# Patient Record
Sex: MALE | Race: OTHER | HISPANIC OR LATINO | URBAN - METROPOLITAN AREA
[De-identification: names, ages, dates, MRNs, and addresses within clinical notes are randomized per-mention and may not be internally consistent; named-entity substitution may affect disease eponyms.]

---

## 2020-02-05 ENCOUNTER — EMERGENCY (EMERGENCY)
Facility: HOSPITAL | Age: 20
LOS: 1 days | Discharge: HOME | End: 2020-02-05
Attending: EMERGENCY MEDICINE
Payer: COMMERCIAL

## 2020-02-05 VITALS
WEIGHT: 149.91 LBS | OXYGEN SATURATION: 99 % | HEART RATE: 77 BPM | TEMPERATURE: 98 F | RESPIRATION RATE: 18 BRPM | SYSTOLIC BLOOD PRESSURE: 132 MMHG | DIASTOLIC BLOOD PRESSURE: 73 MMHG

## 2020-02-05 PROCEDURE — 99283 EMERGENCY DEPT VISIT LOW MDM: CPT

## 2020-02-05 PROCEDURE — 73130 X-RAY EXAM OF HAND: CPT

## 2020-02-05 PROCEDURE — 73130 X-RAY EXAM OF HAND: CPT | Mod: 26,RT

## 2020-02-05 NOTE — ED PROVIDER NOTE - ATTENDING CONTRIBUTION TO CARE
18 yo M INAD presents to ED c/o R thumb pain s/p hyperextension injury while playing basketball earlier tonight.  On exam R hand with increased STS over 1st MCP with pain on AROM, NVI, good cap refill, x-rays beg. Rx splint, RICE, NSAIDs and hand f/u as outpt

## 2020-02-05 NOTE — ED PROVIDER NOTE - PHYSICAL EXAMINATION
Const: Awake, alert and oriented. In no acute distress. Well appearing.  HEENT: NC/AT. Moist mucous membranes.  Neck:. Soft and supple. Full ROM without pain.  Cardiac: +S1/S2. No murmurs. Peripheral pulses 2+ and symmetric. No LE edema.  Resp: Speaking in full sentences. No evidence of respiratory distress. No wheezes, rales or rhonchi.  Abd: Soft, non-tender, non-distended. Normal bowel sounds in all 4 quadrants. No guarding or rebound.  Back: Spine midline and non-tender. No CVAT.  MSK: Tenderness over right scaphoid, FROM in all extremities, neurovasculary intact, radial pulse 2+, hand  5/5   Skin: No rashes, abrasions or lacerations.  Lymph: No cervical lymphadenopathy.  Neuro: Awake, alert & oriented x 3. Moves all extremities symmetrically. Const: Awake, alert and oriented. In no acute distress. Well appearing.  HEENT: NC/AT. Moist mucous membranes.  Neck:. Soft and supple. Full ROM without pain.  Cardiac: +S1/S2. No murmurs. Peripheral pulses 2+ and symmetric. No LE edema.  Resp: Speaking in full sentences. No evidence of respiratory distress. No wheezes, rales or rhonchi.  Abd: Soft, non-tender, non-distended. Normal bowel sounds in all 4 quadrants. No guarding or rebound.  Back: Spine midline and non-tender. No CVAT.  MSK: Tenderness over right 1st digit no scaphoid tenderness, FROM in all extremities, neurovasculary intact, radial pulse 2+, hand  5/5   Skin: No rashes, abrasions or lacerations.  Lymph: No cervical lymphadenopathy.  Neuro: Awake, alert & oriented x 3. Moves all extremities symmetrically.

## 2020-02-05 NOTE — ED PROVIDER NOTE - CLINICAL SUMMARY MEDICAL DECISION MAKING FREE TEXT BOX
Xray of hand - will place in thumb spica splint, pt to follow up with orthopedics, pt explained d/c instructions Xray of hand - t, pt to follow up with orthopedics, pt explained d/c instructions

## 2020-02-05 NOTE — ED PROVIDER NOTE - PATIENT PORTAL LINK FT
You can access the FollowMyHealth Patient Portal offered by [facility] by registering at the following website: [email or web address]. By joining [employer]’s FollowMyHealth portal, you will also be able to view your health information using other applications (apps) compatible with our system.

## 2020-02-05 NOTE — ED PROVIDER NOTE - OBJECTIVE STATEMENT
Patient is a 20 y/o male presenting to the ed with right 1st finger pain. pt states he was playing basketball and outstretched right hand injuring it. pt denies head injury or loc. pt denies numbness or loss of sensation, abrasions or lacerations, back pain, nausea, vomiting, cp

## 2020-02-05 NOTE — ED ADULT TRIAGE NOTE - CHIEF COMPLAINT QUOTE
Patient alert and oriented x4, patient states he fell onto his right hand. swelling and pain to right thumb noted per patient. denies numbness or tingling to RUE.  no obvious deformity noted. denies headstrike with fall.

## 2021-10-07 NOTE — ED PROVIDER NOTE - CROS ED CARDIOVAS ALL NEG
From: [place]  To: Navi Smith  Sent: 10/7/2021 10:19 AM CDT  Subject: My back & a trip to [place]    My back is the same. You may recall that I saw Dr Melendez earlier in the year. His PA gave me a prescription for a pack that contained pills to take for six days. Not too much relief from that. I discussed with him, as you and I have discussed, having shots at a pain clinic prior to a trip / vacation. I will be going to [place] for two weeks this November 21st. How do I proceed to arrange for the shot(s)? Thanks for the handicap sticker. I use it rarely but use has been necessary. Before use I do check around to see who is looking. I don't know what's worse the pain in my back or the pain of pride - lack of humility!   negative...

## 2024-02-27 ENCOUNTER — EMERGENCY (EMERGENCY)
Facility: HOSPITAL | Age: 24
LOS: 1 days | Discharge: HOME | End: 2024-02-27
Attending: STUDENT IN AN ORGANIZED HEALTH CARE EDUCATION/TRAINING PROGRAM
Payer: COMMERCIAL

## 2024-02-27 VITALS
RESPIRATION RATE: 20 BRPM | WEIGHT: 156.75 LBS | TEMPERATURE: 98 F | DIASTOLIC BLOOD PRESSURE: 95 MMHG | OXYGEN SATURATION: 99 % | SYSTOLIC BLOOD PRESSURE: 144 MMHG | HEART RATE: 63 BPM

## 2024-02-27 PROCEDURE — 99283 EMERGENCY DEPT VISIT LOW MDM: CPT

## 2024-02-27 RX ORDER — ACETAMINOPHEN 500 MG
975 TABLET ORAL ONCE
Refills: 0 | Status: COMPLETED | OUTPATIENT
Start: 2024-02-27 | End: 2024-02-27

## 2024-02-27 RX ORDER — OXYCODONE HYDROCHLORIDE 5 MG/1
1 TABLET ORAL
Qty: 12 | Refills: 0
Start: 2024-02-27 | End: 2024-02-29

## 2024-02-27 RX ORDER — IBUPROFEN 200 MG
600 TABLET ORAL ONCE
Refills: 0 | Status: COMPLETED | OUTPATIENT
Start: 2024-02-27 | End: 2024-02-27

## 2024-02-27 RX ADMIN — Medication 1 TABLET(S): at 19:04

## 2024-02-27 RX ADMIN — Medication 975 MILLIGRAM(S): at 19:04

## 2024-02-27 RX ADMIN — Medication 600 MILLIGRAM(S): at 19:04

## 2024-02-27 NOTE — ED PROVIDER NOTE - PHYSICAL EXAMINATION
Gen: NAD, non-toxic, conversational  Eyes: PERRLA, EOMI   HENT: Normocephalic, atraumatic but with swelling; right upper premolar with fracture / decay, local swelling, no white dot / spot on buccal surface to indicate abscess. Swelling along the right maxillary region without any swelling of the periorbital region. There is a small right upper lid stye that is mildly inflamed. External ears normal, no rhinorrhea, moist mucous membranes.   CV: RRR, no M/R/G  Resp: CTAB, non-labored, speaking without difficulty on room air  Abd: soft, non tender, non rigid, no guarding or rebound tenderness  Back: No CVAT bilaterally, no midline ttp  Skin: dry, wwp   Neuro: AOx3, speech is fluent and appropriate  Psych: Mood ok, affect euthymic

## 2024-02-27 NOTE — ED PROVIDER NOTE - NSFOLLOWUPINSTRUCTIONS_ED_ALL_ED_FT
Take acetaminophen (Tylenol) 975mg (3 regular strength tablets or 2 extra strength tablets) as often as every 6 hours as needed for pain. Never take more than 4000mg of acetaminophen/Tylenol in any 24 hour period. Be cautious of over the counter medications as many formularies contain acetaminophen in them.     Take the oxycodone medication as frequently as once every 6 hours as needed for pain that is uncontrolled by your other pain medications. Do not drink alcohol while using this medication. Do not take this medication with benzodiazepine medications or other opioids. Do not drive or do any potentially harmful tasks while using this medication or for 6-8 hours following.     Take ibuprofen (or Motrin) 600mg (3 tablets) up to 4 times per day as needed for pain with food or milk.     Stye    Apply warm compresses to your right eye 4-5 times daily. Get a rag and soak it with hot water, not too hot that you would burn yourself, but warm enough that it feels like it is heating up the area relative to your body temp. Follow up with the ophthalmologist if you do not have significant improvement in symptoms over the next few days.     Dental Pain    Dental pain (toothache) may be caused by many things including tooth decay (cavities or caries), abscess or infection, or trauma. If you were prescribed an antibiotic medicine, finish all of it even if you start to feel better. Rinsing your mouth with salt water or applying ice to the painful area of your face may help with the pain. Follow up with a dentist is important in ensuring good oral health and preventing the worsening of dental disease.    SEEK IMMEDIATE MEDICAL CARE IF YOU HAVE ANY OF THE FOLLOWING SYMPTOMS: unable to open your mouth, trouble breathing or swallowing, fever, or swelling of the face, neck, or jaw.

## 2024-02-27 NOTE — ED PROVIDER NOTE - OBJECTIVE STATEMENT
23-year-old male presenting for evaluation of right upper mouth pain due to a dental fracture as well as facial swelling along the right maxilla.  States that his symptoms have worsened over the past 12 hours.  Could feel the tooth starting to bother him last night.  No difficulties breathing, no difficulty swallowing, called his dentist to schedule an appointment can be seen in a couple of days.  Denies other symptoms of acute concern at this time.  Rates his pain as a 9 out of 10.

## 2024-02-27 NOTE — ED ADULT TRIAGE NOTE - CHIEF COMPLAINT QUOTE
C/O right sided facial swelling for one day with pain. PT states he has a broken tooth and thinks he has an abscess now. denies fevers.

## 2024-02-27 NOTE — ED PROVIDER NOTE - CLINICAL SUMMARY MEDICAL DECISION MAKING FREE TEXT BOX
23-year-old male presenting with right upper dental infection of premolar which appears fractured on exam as well as stye on his right eyelid.  Notes he is here for the dental infection though did recommend warm soaks/compresses for the right eyelid.  Will treat with medications conservatively here, stronger pain regimen sent to his pharmacy given he is driving.  Will discharge with outpatient follow-up given he is already established an appointment with dental.  Return precautions for any significant worsening or changes

## 2024-02-27 NOTE — ED ADULT TRIAGE NOTE - TEMPERATURE IN CELSIUS (DEGREES C)
36.7 CM in to see Pt to follow up on discharge planning. Plan remains home with his wife. Pt denies any needs at this time.      CM following

## 2024-02-27 NOTE — ED PROVIDER NOTE - PATIENT PORTAL LINK FT
You can access the FollowMyHealth Patient Portal offered by [facility] by registering at the following website: [email or web address]. By joining [employer]’s FollowMyHealth portal, you will also be able to view your health information using other applications (apps) compatible with our system.

## 2024-03-06 ENCOUNTER — OFFICE (OUTPATIENT)
Dept: URBAN - METROPOLITAN AREA CLINIC 12 | Facility: CLINIC | Age: 24
Setting detail: OPHTHALMOLOGY
End: 2024-03-06
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
## 2024-03-06 PROCEDURE — NO SHOW FE NO SHOW FEE: Performed by: STUDENT IN AN ORGANIZED HEALTH CARE EDUCATION/TRAINING PROGRAM

## 2024-03-07 ENCOUNTER — OFFICE (OUTPATIENT)
Dept: URBAN - METROPOLITAN AREA CLINIC 12 | Facility: CLINIC | Age: 24
Setting detail: OPHTHALMOLOGY
End: 2024-03-07
Payer: COMMERCIAL

## 2024-03-07 DIAGNOSIS — H00.11: ICD-10-CM

## 2024-03-07 PROCEDURE — 99203 OFFICE O/P NEW LOW 30 MIN: CPT | Performed by: STUDENT IN AN ORGANIZED HEALTH CARE EDUCATION/TRAINING PROGRAM

## 2024-03-07 ASSESSMENT — LID EXAM ASSESSMENTS
OD_COMMENTS: COLLARETTES
OS_COMMENTS: COLLARETTES

## 2024-03-18 ENCOUNTER — EMERGENCY (EMERGENCY)
Facility: HOSPITAL | Age: 24
LOS: 1 days | Discharge: HOME | End: 2024-03-18
Attending: STUDENT IN AN ORGANIZED HEALTH CARE EDUCATION/TRAINING PROGRAM
Payer: COMMERCIAL

## 2024-03-18 VITALS
OXYGEN SATURATION: 99 % | DIASTOLIC BLOOD PRESSURE: 75 MMHG | SYSTOLIC BLOOD PRESSURE: 133 MMHG | WEIGHT: 149.91 LBS | TEMPERATURE: 98 F | HEIGHT: 73 IN | RESPIRATION RATE: 18 BRPM | HEART RATE: 72 BPM

## 2024-03-18 PROCEDURE — 99283 EMERGENCY DEPT VISIT LOW MDM: CPT

## 2024-03-18 PROCEDURE — 99284 EMERGENCY DEPT VISIT MOD MDM: CPT

## 2024-03-18 RX ORDER — OXYCODONE AND ACETAMINOPHEN 5; 325 MG/1; MG/1
1 TABLET ORAL
Qty: 8 | Refills: 0
Start: 2024-03-18 | End: 2024-03-19

## 2024-03-18 RX ORDER — IBUPROFEN 200 MG
1 TABLET ORAL
Qty: 28 | Refills: 0
Start: 2024-03-18 | End: 2024-03-24

## 2024-03-18 RX ADMIN — Medication 1 TABLET(S): at 21:56

## 2024-03-18 NOTE — ED PROVIDER NOTE - NSFOLLOWUPINSTRUCTIONS_ED_ALL_ED_FT
For the Patient  Dealing with tooth pain  Do you have tooth pain? Whether it’s a short-lived,  sharp, shooting pain or a prolonged, mild ache, you  should see your dentist. (NOTE: There are other  sources of oral pain that are not discussed here, like oral sores,  jaw pain, and headaches that your dentist also may be able to  help with.)  TYPES OF PAIN  Sharp pain  You may feel a shooting pain when you eat or drink something hot or cold or sweet or sour. Pressure, like  from toothbrushing or biting, also might spark this kind of  pain.  Some things that may cause this short-lived pain reaction  include1  n a cavity;  n a cracked tooth;  n an exposed tooth root.  Any of these can leave the inner portion of your tooth,  called the pulp, unprotected. The pulp is your tooth’s nerve  and blood supply. In a healthy, undamaged tooth, the pulp is  protected by 3 outer layers: enamel, cementum, and dentin.  Enamel is the part of the tooth that you see, and it connects to  the dentin. Cementum also connects to the dentin, but it  covers the tooth root (Figure).  Things that damage the enamel, like a cavity, chip, or  crack, may cause tooth pain.  Anything that exposes the cementum also might set you up  for pain. Cementum is softer than enamel. When it is left  unprotected by the gums, it can be worn away easily.  Damage to the enamel or cementum may leave the dentin  exposed. The dentin directly connects to the pulp through  tiny tubes or canals. Researchers do not know why, but exposure  of the dentin may leave the pulp sensitive to things like changes  in temperature, certain foods and beverages, or pressure.  Dull, throbbing pain  Sometimes dental pain involves an area in or around the  mouth and jaw with a steady ache that goes on for days. This  type of pain may indicate an infection.  TREATMENT  Treatment depends on the cause of the pain you are having. Sharp pain might be caused by enamel damage like  cavities, chips, or cracks, which might call for repair. You  may need a new dental filling or crown. When the  cementum is damageddexposing the dentindtopical varnish, which goes on as a liquid and then hardens to protect  the exposed tooth roots, might be applied.  Treatment for throbbing pains also depends on what is  causing the problem. If an infection is involved, your dentist  will work to identify the source. Once that has been narrowed  down, he or she can look at options like removing the infected  tissue (for example, with a root canal).  CONCLUSION  Tooth pain can be experienced in different ways. Taking care  of the problem requires finding the reason for the pain. n

## 2024-03-18 NOTE — ED PROVIDER NOTE - ATTENDING APP SHARED VISIT CONTRIBUTION OF CARE
23 year old male present to ED for right sided facial pain, tracks along his premolars, has had pain from dental caries in the past, no obvious abscess, stable for outpt follow up with dental on antibiotics

## 2024-03-18 NOTE — ED PROVIDER NOTE - PATIENT PORTAL LINK FT
You can access the FollowMyHealth Patient Portal offered by [facility] by registering at the following website: [email or web address]. By joining [employer]’s FollowMyHealth portal, you will also be able to view your health information using other applications (apps) compatible with our system.

## 2024-03-18 NOTE — ED PROVIDER NOTE - NSFOLLOWUPCLINICS_GEN_ALL_ED_FT
[facility]  [place] [address]  Phone: [phone]  Fax:

## 2024-03-18 NOTE — ED PROVIDER NOTE - PHYSICAL EXAMINATION
Gen: No acute distress, non toxic  HEENT: +swelling, ttp right maxilla no fluctuance. superior posterior molar with dental fracture, no fluctuance, drainage.  Mucous membranes moist, pink conjunctivae, EOMI  CV: RRR, nl s1/s2.  Resp: CTAB, normal rate and effort  GI: Abdomen soft, NT, ND. No rebound, no guarding  Neuro: A&O x 3, moving all 4 extremities  MSK: No deformities.   Skin: No rashes. intact and perfused.

## 2024-03-18 NOTE — ED PROVIDER NOTE - CLINICAL SUMMARY MEDICAL DECISION MAKING FREE TEXT BOX
23 year old male present to ED for right sided facial pain. Pt reports right posterior maxillary dental fracture s/p root canal 1 year ago. pt reports similar pain one month prior that resolved with abx. Pt states 2 days of pain to right superior molar, 6 hours of swelling to right maxilla. Pain 9/10. Pt reports he has dental follow up tomorrow 3/19. Pt denies fever, chills, discharge, drainage, difficulty swallowing, lightheadedness, SOB, CP, abd pain.   Pt with dental infection, pain. No systemic signs of infection, no sign of abscess  Pain control, abx, out paitent follow up

## 2024-03-18 NOTE — ED ADULT NURSE NOTE - OBJECTIVE STATEMENT
A&OX4, patient is complaining of right side tooth ache, right facial swelling noted, denies sob, denies chest pain, denies numbness and tingling.

## 2024-03-18 NOTE — ED PROVIDER NOTE - OBJECTIVE STATEMENT
23 year old male present to ED for right sided facial pain. Pt reports right posterior maxillary dental fracture s/p root canal 1 year ago. pt reports similar pain one month prior that resolved with abx. Pt states 2 days of pain to right superior molar, 6 hours of swelling to right maxilla. Pain 9/10. Pt reports he has dental follow up tomorrow 3/19. Pt denies fever, chills, discharge, drainage, difficulty swallowing, lightheadedness, SOB, CP, abd pain.

## 2024-03-18 NOTE — ED PROVIDER NOTE - IV ALTEPLASE DOOR HIDDEN
Department of Dermatology    Liquid Nitrogen Treatment    · Liquid nitrogen is a cold, liquifed gas with a temperature of 196 degrees below zero Celsius (-321 degrees Farerhheit).  It is used to freeze and destroy superficial skin growths such as warts and keratoses.    · No special care is needed immediately after liquid nitrogen treatment.  Just ignore it.  You can wash your skin as usual.  If clothing irritates the area, cover it with a band-aid.    · Liquid nitrogen causes stinging and some pain while the growth is being frozen and the thawed.  This discomfort usually lasts less than one hour.    · After a liquid nitrogen treatment, your skin will become swollen and red.  Later, it may blister, and a scab (crust) will form.  The scab will fall off by itself, leaving healthy, new skin.    Growth treated on the face heal fairly quickly, usually about two weeks.  The chest, back, and arms take longer to heal, often 4 to 5 weeks.  The lower legs may take 6 or more weeks to heal.    If your growth required deep freezing, there may be considerable blistering and swelling, especially if your hands, scalp, or eyelids were treated.  Sometimes, a blood blister may appear.  The blisters and swelling are part of the treatment and will gradually heal by themselves.  If a blister is annoyingly large, you may puncture it with a sterile needle and absorb the fluid with a tissue.  Afterward, cover it with a band-aid for 4-10 hours.  Keep it moist by using a sterile Vaseline packet (if one was given to you).    Sometimes, liquid nitrogen treatment fails.  If the growth is not removed by liquid nitrogen treatment, please make a return appointment.    [facility] - DERMATOLOGY / [address]  [address] [place] [address]  Dept: [phone]         Additional Educational Resources:  For additional resources regarding your symptoms, diagnosis, or further health information, please visit the Health Resources section on  [email or web address] or the Online Health Resources section in [employer].                     show

## 2024-03-19 PROBLEM — Z78.9 OTHER SPECIFIED HEALTH STATUS: Chronic | Status: ACTIVE | Noted: 2024-02-27

## 2024-03-21 ENCOUNTER — RX ONLY (RX ONLY)
Age: 24
End: 2024-03-21

## 2024-03-21 ENCOUNTER — OFFICE (OUTPATIENT)
Dept: URBAN - METROPOLITAN AREA CLINIC 100 | Facility: CLINIC | Age: 24
Setting detail: OPHTHALMOLOGY
End: 2024-03-21
Payer: COMMERCIAL

## 2024-03-21 DIAGNOSIS — H00.11: ICD-10-CM

## 2024-03-21 PROBLEM — H01.004 BLEPHARITIS; RIGHT UPPER LID, LEFT UPPER LID: Status: ACTIVE | Noted: 2024-03-21

## 2024-03-21 PROBLEM — H01.001 BLEPHARITIS; RIGHT UPPER LID, LEFT UPPER LID: Status: ACTIVE | Noted: 2024-03-21

## 2024-03-21 PROCEDURE — 67800 REMOVE EYELID LESION: CPT | Mod: E3 | Performed by: OPHTHALMOLOGY

## 2024-03-21 PROCEDURE — 92285 EXTERNAL OCULAR PHOTOGRAPHY: CPT | Performed by: OPHTHALMOLOGY

## 2024-03-21 ASSESSMENT — LID EXAM ASSESSMENTS
OD_BLEPHARITIS: RUL
OS_BLEPHARITIS: LUL

## 2024-04-04 ENCOUNTER — OFFICE (OUTPATIENT)
Dept: URBAN - METROPOLITAN AREA CLINIC 100 | Facility: CLINIC | Age: 24
Setting detail: OPHTHALMOLOGY
End: 2024-04-04
Payer: COMMERCIAL

## 2024-04-04 DIAGNOSIS — H01.004: ICD-10-CM

## 2024-04-04 DIAGNOSIS — H00.11: ICD-10-CM

## 2024-04-04 DIAGNOSIS — H01.001: ICD-10-CM

## 2024-04-04 PROCEDURE — 92012 INTRM OPH EXAM EST PATIENT: CPT | Performed by: OPHTHALMOLOGY

## 2024-04-04 ASSESSMENT — LID EXAM ASSESSMENTS
OS_BLEPHARITIS: LUL
OD_BLEPHARITIS: RUL

## 2024-04-14 ENCOUNTER — EMERGENCY (EMERGENCY)
Facility: HOSPITAL | Age: 24
LOS: 1 days | Discharge: HOME | End: 2024-04-14
Attending: STUDENT IN AN ORGANIZED HEALTH CARE EDUCATION/TRAINING PROGRAM
Payer: COMMERCIAL

## 2024-04-14 VITALS
HEIGHT: 73 IN | DIASTOLIC BLOOD PRESSURE: 71 MMHG | HEART RATE: 86 BPM | SYSTOLIC BLOOD PRESSURE: 128 MMHG | WEIGHT: 149.91 LBS | TEMPERATURE: 98 F | OXYGEN SATURATION: 99 % | RESPIRATION RATE: 18 BRPM

## 2024-04-14 LAB
ALBUMIN SERPL ELPH-MCNC: 4.5 G/DL — SIGNIFICANT CHANGE UP (ref 3.3–5.2)
ALP SERPL-CCNC: 114 U/L — SIGNIFICANT CHANGE UP (ref 40–120)
ALT FLD-CCNC: 41 U/L — HIGH
ANION GAP SERPL CALC-SCNC: 12 MMOL/L — SIGNIFICANT CHANGE UP (ref 5–17)
AST SERPL-CCNC: 30 U/L — SIGNIFICANT CHANGE UP
BASOPHILS # BLD AUTO: 0.01 K/UL — SIGNIFICANT CHANGE UP (ref 0–0.2)
BASOPHILS NFR BLD AUTO: 0.1 % — SIGNIFICANT CHANGE UP (ref 0–2)
BILIRUB SERPL-MCNC: 0.3 MG/DL — LOW (ref 0.4–2)
BUN SERPL-MCNC: 9.3 MG/DL — SIGNIFICANT CHANGE UP (ref 8–20)
CALCIUM SERPL-MCNC: 9.4 MG/DL — SIGNIFICANT CHANGE UP (ref 8.4–10.5)
CHLORIDE SERPL-SCNC: 100 MMOL/L — SIGNIFICANT CHANGE UP (ref 96–108)
CK MB CFR SERPL CALC: <1 NG/ML — SIGNIFICANT CHANGE UP (ref 0–6.7)
CK SERPL-CCNC: 191 U/L — SIGNIFICANT CHANGE UP (ref 30–200)
CO2 SERPL-SCNC: 28 MMOL/L — SIGNIFICANT CHANGE UP (ref 22–29)
CREAT SERPL-MCNC: 0.75 MG/DL — SIGNIFICANT CHANGE UP (ref 0.5–1.3)
D DIMER BLD IA.RAPID-MCNC: <150 NG/ML DDU — SIGNIFICANT CHANGE UP
EGFR: 130 ML/MIN/1.73M2 — SIGNIFICANT CHANGE UP
EOSINOPHIL # BLD AUTO: 0.05 K/UL — SIGNIFICANT CHANGE UP (ref 0–0.5)
EOSINOPHIL NFR BLD AUTO: 0.5 % — SIGNIFICANT CHANGE UP (ref 0–6)
GLUCOSE SERPL-MCNC: 99 MG/DL — SIGNIFICANT CHANGE UP (ref 70–99)
HCT VFR BLD CALC: 44.6 % — SIGNIFICANT CHANGE UP (ref 39–50)
HGB BLD-MCNC: 15.2 G/DL — SIGNIFICANT CHANGE UP (ref 13–17)
IMM GRANULOCYTES NFR BLD AUTO: 0.6 % — SIGNIFICANT CHANGE UP (ref 0–0.9)
LYMPHOCYTES # BLD AUTO: 1.49 K/UL — SIGNIFICANT CHANGE UP (ref 1–3.3)
LYMPHOCYTES # BLD AUTO: 14 % — SIGNIFICANT CHANGE UP (ref 13–44)
MAGNESIUM SERPL-MCNC: 1.9 MG/DL — SIGNIFICANT CHANGE UP (ref 1.6–2.6)
MCHC RBC-ENTMCNC: 29.3 PG — SIGNIFICANT CHANGE UP (ref 27–34)
MCHC RBC-ENTMCNC: 34.1 GM/DL — SIGNIFICANT CHANGE UP (ref 32–36)
MCV RBC AUTO: 86.1 FL — SIGNIFICANT CHANGE UP (ref 80–100)
MONOCYTES # BLD AUTO: 1.03 K/UL — HIGH (ref 0–0.9)
MONOCYTES NFR BLD AUTO: 9.7 % — SIGNIFICANT CHANGE UP (ref 2–14)
NEUTROPHILS # BLD AUTO: 8.02 K/UL — HIGH (ref 1.8–7.4)
NEUTROPHILS NFR BLD AUTO: 75.1 % — SIGNIFICANT CHANGE UP (ref 43–77)
NT-PROBNP SERPL-SCNC: <36 PG/ML — SIGNIFICANT CHANGE UP (ref 0–300)
PLATELET # BLD AUTO: 235 K/UL — SIGNIFICANT CHANGE UP (ref 150–400)
POTASSIUM SERPL-MCNC: 3.9 MMOL/L — SIGNIFICANT CHANGE UP (ref 3.5–5.3)
POTASSIUM SERPL-SCNC: 3.9 MMOL/L — SIGNIFICANT CHANGE UP (ref 3.5–5.3)
PROT SERPL-MCNC: 8.2 G/DL — SIGNIFICANT CHANGE UP (ref 6.6–8.7)
RBC # BLD: 5.18 M/UL — SIGNIFICANT CHANGE UP (ref 4.2–5.8)
RBC # FLD: 11.9 % — SIGNIFICANT CHANGE UP (ref 10.3–14.5)
SODIUM SERPL-SCNC: 140 MMOL/L — SIGNIFICANT CHANGE UP (ref 135–145)
TROPONIN T, HIGH SENSITIVITY RESULT: <6 NG/L — SIGNIFICANT CHANGE UP (ref 0–51)
WBC # BLD: 10.66 K/UL — HIGH (ref 3.8–10.5)
WBC # FLD AUTO: 10.66 K/UL — HIGH (ref 3.8–10.5)

## 2024-04-14 PROCEDURE — 36415 COLL VENOUS BLD VENIPUNCTURE: CPT

## 2024-04-14 PROCEDURE — 71046 X-RAY EXAM CHEST 2 VIEWS: CPT | Mod: 26

## 2024-04-14 PROCEDURE — 71046 X-RAY EXAM CHEST 2 VIEWS: CPT

## 2024-04-14 PROCEDURE — 85379 FIBRIN DEGRADATION QUANT: CPT

## 2024-04-14 PROCEDURE — 93005 ELECTROCARDIOGRAM TRACING: CPT

## 2024-04-14 PROCEDURE — 99285 EMERGENCY DEPT VISIT HI MDM: CPT | Mod: 25

## 2024-04-14 PROCEDURE — 93010 ELECTROCARDIOGRAM REPORT: CPT

## 2024-04-14 PROCEDURE — 83735 ASSAY OF MAGNESIUM: CPT

## 2024-04-14 PROCEDURE — 83880 ASSAY OF NATRIURETIC PEPTIDE: CPT

## 2024-04-14 PROCEDURE — 99285 EMERGENCY DEPT VISIT HI MDM: CPT

## 2024-04-14 PROCEDURE — 84484 ASSAY OF TROPONIN QUANT: CPT

## 2024-04-14 PROCEDURE — 85025 COMPLETE CBC W/AUTO DIFF WBC: CPT

## 2024-04-14 PROCEDURE — 80053 COMPREHEN METABOLIC PANEL: CPT

## 2024-04-14 PROCEDURE — 82550 ASSAY OF CK (CPK): CPT

## 2024-04-14 PROCEDURE — 82553 CREATINE MB FRACTION: CPT

## 2024-04-14 RX ORDER — CEPHALEXIN 500 MG
1 CAPSULE ORAL
Qty: 40 | Refills: 0
Start: 2024-04-14 | End: 2024-04-23

## 2024-04-14 RX ORDER — IBUPROFEN 200 MG
400 TABLET ORAL ONCE
Refills: 0 | Status: COMPLETED | OUTPATIENT
Start: 2024-04-14 | End: 2024-04-14

## 2024-04-14 RX ADMIN — Medication 400 MILLIGRAM(S): at 11:50

## 2024-04-14 NOTE — ED PROVIDER NOTE - NS ED ATTENDING STATEMENT MOD
Attending Only This was a shared visit with the FLORIDALMA. I reviewed and verified the documentation.

## 2024-04-14 NOTE — ED PROVIDER NOTE - OBJECTIVE STATEMENT
23-year-old male with no past medical history presents with sharp pain in his axilla intermittently x 1 week worse over the last couple days, and this morning since 7:30 AM woke up with sharp left-sided pleuritic chest discomfort when he went to  his daughter and noticed some wheezing.  No other trauma no heavy lifting no shortness of breath.  No family history of sudden cardiac death or early cardiac disease.  Travel to [place] 3 months ago.

## 2024-04-14 NOTE — ED PROVIDER NOTE - PHYSICAL EXAMINATION
PHYSICAL EXAM:   General: well-appearing, appears stated age, not in extremis   HEENT: NC/AT, airway patent  Cardiovascular: regular rate and rhythm, + S1/S2, no murmurs, rubs, gallops appreciated  Respiratory: clear to auscultation bilaterally, good aeration bilaterally, nonlabored respirations  Abdominal: soft, nontender, nondistended, no rebound, guarding or rigidity  Back: no costovertebral tenderness,   Extremities: no LE edema or calf tenderness b/l. Radial pulses equal and strong b/l  Neuro: Alert and oriented x3. Moving all extremities.   Psychiatric: appropriate mood and affect.   Skin: b/l tender axillary lymph nodes noted, R submandibular lymph node noted, small erytehmatous tender nodule at 1 oclock position on L breast, no fluctuance palpated  -Susan Ren MD Attending Physician

## 2024-04-14 NOTE — ED PROVIDER NOTE - PATIENT PORTAL LINK FT
You can access the FollowMyHealth Patient Portal offered by [facility] by registering at the following website: [email or web address]. By joining [employer]’s FollowMyHealth portal, you will also be able to view your health information using other applications (apps) compatible with our system.

## 2024-04-14 NOTE — ED PROVIDER NOTE - CLINICAL SUMMARY MEDICAL DECISION MAKING FREE TEXT BOX
History and physical as noted.  Pleuritic chest discomfort, travel to [place] 3 months ago, but no other risk factors for DVT PE.  No shortness of breath, no tachycardia, no family history of early heart disease.  Will get screening labs EKG and D-dimer.  EKG with normal sinus rhythm normal axis normal intervals no acute diagnostic ischemic changes, likely benign early repoll V2 to V3.  Chest pain not improved with leaning forward.  EKG not consistent with pericarditis.  On exam patient with tender axillary nodules and chest nodule. POCUS of area to 1 oclock position on L breast near nipple with some edema/cobblestonning noted. no drainable collection or fluid.  No fevers no recent illness.  Possibly enlarged lymph nodes in the axilla, will give Motrin for symptomatic control.  If workup today is not remarkable will give antibiotics for tender nodule on breast for possible early cellulitis/early phlegmon.  Return precautions discussed with patient for worsening pain swelling fever despite being on antibiotics.

## 2024-04-14 NOTE — ED ADULT NURSE NOTE - OBJECTIVE STATEMENT
Pt is here with c/o Chest pain for the past two days.  Also c/o swelling in his underarms.  Pt denies cough, denies fever or SOB.  #20G IV inserted to [facility], labs sent.  Pt medicated for pain as ordered.

## 2024-04-14 NOTE — ED PROVIDER NOTE - ATTENDING APP SHARED VISIT CONTRIBUTION OF CARE
I, Susan Ren, performed the initial face to face bedside interview with this patient regarding history of present illness, review of symptoms and relevant past medical, social and/or family history.  I completed an independent physical examination and MDM.  I was the initial provider who evaluated this patient. I have signed out the follow up and disposition of any pending tests (i.e. labs, radiological studies) to the ACP.  I have communicated the patient’s plan of care and disposition with the ACP.

## 2025-03-10 ENCOUNTER — OFFICE (OUTPATIENT)
Dept: URBAN - METROPOLITAN AREA CLINIC 100 | Facility: CLINIC | Age: 25
Setting detail: OPHTHALMOLOGY
End: 2025-03-10
Payer: COMMERCIAL

## 2025-03-10 DIAGNOSIS — H01.001: ICD-10-CM

## 2025-03-10 DIAGNOSIS — H01.004: ICD-10-CM

## 2025-03-10 DIAGNOSIS — H00.11: ICD-10-CM

## 2025-03-10 PROCEDURE — 92285 EXTERNAL OCULAR PHOTOGRAPHY: CPT | Performed by: OPHTHALMOLOGY

## 2025-03-10 PROCEDURE — 92012 INTRM OPH EXAM EST PATIENT: CPT | Performed by: OPHTHALMOLOGY

## 2025-03-10 ASSESSMENT — REFRACTION_AUTOREFRACTION
OS_AXIS: 081
OS_SPHERE: -0.25
OD_SPHERE: -0.25
OD_CYLINDER: -0.25
OS_CYLINDER: -0.50
OD_AXIS: 073

## 2025-03-10 ASSESSMENT — CONFRONTATIONAL VISUAL FIELD TEST (CVF)
OS_FINDINGS: FULL
OD_FINDINGS: FULL

## 2025-03-10 ASSESSMENT — KERATOMETRY
OS_K1POWER_DIOPTERS: 41.50
OD_K1POWER_DIOPTERS: 41.75
OD_AXISANGLE_DEGREES: 132
OD_K2POWER_DIOPTERS: 42.50
OS_AXISANGLE_DEGREES: 048
OS_K2POWER_DIOPTERS: 41.75

## 2025-03-10 ASSESSMENT — LID EXAM ASSESSMENTS
OS_BLEPHARITIS: LUL
OD_BLEPHARITIS: RUL

## 2025-03-10 ASSESSMENT — VISUAL ACUITY
OD_BCVA: 20/20
OS_BCVA: 20/20

## 2025-04-17 ENCOUNTER — RX ONLY (RX ONLY)
Age: 25
End: 2025-04-17

## 2025-04-17 ENCOUNTER — OFFICE (OUTPATIENT)
Dept: URBAN - METROPOLITAN AREA CLINIC 100 | Facility: CLINIC | Age: 25
Setting detail: OPHTHALMOLOGY
End: 2025-04-17
Payer: COMMERCIAL

## 2025-04-17 DIAGNOSIS — D23.111: ICD-10-CM

## 2025-04-17 PROCEDURE — 67840 REMOVE EYELID LESION: CPT | Mod: E3 | Performed by: OPHTHALMOLOGY

## 2025-04-17 ASSESSMENT — KERATOMETRY
OS_K1POWER_DIOPTERS: 41.50
OD_K1POWER_DIOPTERS: 41.75
OS_K2POWER_DIOPTERS: 41.75
OD_K2POWER_DIOPTERS: 42.50
OS_AXISANGLE_DEGREES: 048
OD_AXISANGLE_DEGREES: 132

## 2025-04-17 ASSESSMENT — CONFRONTATIONAL VISUAL FIELD TEST (CVF)
OD_FINDINGS: FULL
OS_FINDINGS: FULL

## 2025-04-17 ASSESSMENT — REFRACTION_AUTOREFRACTION
OS_SPHERE: -0.25
OS_AXIS: 081
OD_CYLINDER: -0.25
OS_CYLINDER: -0.50
OD_AXIS: 073
OD_SPHERE: -0.25

## 2025-04-17 ASSESSMENT — VISUAL ACUITY
OD_BCVA: 20/20
OS_BCVA: 20/20

## 2025-04-17 ASSESSMENT — LID EXAM ASSESSMENTS
OD_BLEPHARITIS: RUL
OS_BLEPHARITIS: LUL

## 2025-05-01 ENCOUNTER — OFFICE (OUTPATIENT)
Dept: URBAN - METROPOLITAN AREA CLINIC 100 | Facility: CLINIC | Age: 25
Setting detail: OPHTHALMOLOGY
End: 2025-05-01
Payer: COMMERCIAL

## 2025-05-01 DIAGNOSIS — H01.004: ICD-10-CM

## 2025-05-01 DIAGNOSIS — H01.001: ICD-10-CM

## 2025-05-01 DIAGNOSIS — H00.11: ICD-10-CM

## 2025-05-01 DIAGNOSIS — D23.111: ICD-10-CM

## 2025-05-01 PROCEDURE — 92012 INTRM OPH EXAM EST PATIENT: CPT | Performed by: OPHTHALMOLOGY

## 2025-05-01 ASSESSMENT — CONFRONTATIONAL VISUAL FIELD TEST (CVF)
OS_FINDINGS: FULL
OD_FINDINGS: FULL

## 2025-05-01 ASSESSMENT — KERATOMETRY
OD_K2POWER_DIOPTERS: 42.50
OS_K2POWER_DIOPTERS: 41.75
OS_K1POWER_DIOPTERS: 41.50
OD_AXISANGLE_DEGREES: 132
OS_AXISANGLE_DEGREES: 048
OD_K1POWER_DIOPTERS: 41.75

## 2025-05-01 ASSESSMENT — VISUAL ACUITY
OD_BCVA: 20/20
OS_BCVA: 20/20-2

## 2025-05-01 ASSESSMENT — LID EXAM ASSESSMENTS
OD_BLEPHARITIS: RUL
OS_BLEPHARITIS: LUL

## 2025-05-01 ASSESSMENT — REFRACTION_AUTOREFRACTION
OD_CYLINDER: -0.25
OS_AXIS: 081
OD_SPHERE: -0.25
OS_SPHERE: -0.25
OD_AXIS: 073
OS_CYLINDER: -0.50